# Patient Record
Sex: MALE | Race: BLACK OR AFRICAN AMERICAN | NOT HISPANIC OR LATINO | Employment: UNEMPLOYED | URBAN - METROPOLITAN AREA
[De-identification: names, ages, dates, MRNs, and addresses within clinical notes are randomized per-mention and may not be internally consistent; named-entity substitution may affect disease eponyms.]

---

## 2021-06-27 ENCOUNTER — HOSPITAL ENCOUNTER (EMERGENCY)
Facility: HOSPITAL | Age: 26
Discharge: HOME | End: 2021-06-28
Attending: EMERGENCY MEDICINE
Payer: MEDICAID

## 2021-06-27 DIAGNOSIS — R07.9 CHEST PAIN: ICD-10-CM

## 2021-06-27 DIAGNOSIS — G47.00 INSOMNIA, UNSPECIFIED TYPE: ICD-10-CM

## 2021-06-27 DIAGNOSIS — F16.10 ECSTASY ABUSE: Primary | ICD-10-CM

## 2021-06-27 PROCEDURE — 99284 EMERGENCY DEPT VISIT MOD MDM: CPT | Mod: 25,ER

## 2021-06-27 PROCEDURE — 36000 PLACE NEEDLE IN VEIN: CPT | Mod: ER

## 2021-06-28 VITALS
RESPIRATION RATE: 17 BRPM | HEIGHT: 73 IN | WEIGHT: 145 LBS | DIASTOLIC BLOOD PRESSURE: 75 MMHG | SYSTOLIC BLOOD PRESSURE: 116 MMHG | BODY MASS INDEX: 19.22 KG/M2 | HEART RATE: 56 BPM | OXYGEN SATURATION: 97 % | TEMPERATURE: 98 F

## 2021-06-28 LAB
ALBUMIN SERPL BCP-MCNC: 4.4 G/DL (ref 3.5–5.2)
ALP SERPL-CCNC: 86 U/L (ref 38–126)
ALT SERPL W/O P-5'-P-CCNC: 33 U/L (ref 10–44)
ANION GAP SERPL CALC-SCNC: 9 MMOL/L (ref 8–16)
AST SERPL-CCNC: 30 U/L (ref 15–46)
BASOPHILS # BLD AUTO: 0.06 K/UL (ref 0–0.2)
BASOPHILS NFR BLD: 0.8 % (ref 0–1.9)
BILIRUB SERPL-MCNC: 0.5 MG/DL (ref 0.1–1)
CALCIUM SERPL-MCNC: 9.7 MG/DL (ref 8.7–10.5)
CHLORIDE SERPL-SCNC: 104 MMOL/L (ref 95–110)
CO2 SERPL-SCNC: 25 MMOL/L (ref 23–29)
CREAT SERPL-MCNC: 1.3 MG/DL (ref 0.5–1.4)
DIFFERENTIAL METHOD: ABNORMAL
EOSINOPHIL # BLD AUTO: 0.4 K/UL (ref 0–0.5)
EOSINOPHIL NFR BLD: 5.4 % (ref 0–8)
ERYTHROCYTE [DISTWIDTH] IN BLOOD BY AUTOMATED COUNT: 13 % (ref 11.5–14.5)
EST. GFR  (AFRICAN AMERICAN): >60 ML/MIN/1.73 M^2
EST. GFR  (NON AFRICAN AMERICAN): >60 ML/MIN/1.73 M^2
GLUCOSE SERPL-MCNC: 92 MG/DL (ref 70–110)
HCT VFR BLD AUTO: 43.8 % (ref 40–54)
HGB BLD-MCNC: 15 G/DL (ref 14–18)
IMM GRANULOCYTES # BLD AUTO: 0.01 K/UL (ref 0–0.04)
IMM GRANULOCYTES NFR BLD AUTO: 0.1 % (ref 0–0.5)
LYMPHOCYTES # BLD AUTO: 4.5 K/UL (ref 1–4.8)
LYMPHOCYTES NFR BLD: 63.8 % (ref 18–48)
MCH RBC QN AUTO: 29.4 PG (ref 27–31)
MCHC RBC AUTO-ENTMCNC: 34.2 G/DL (ref 32–36)
MCV RBC AUTO: 86 FL (ref 82–98)
MONOCYTES # BLD AUTO: 0.5 K/UL (ref 0.3–1)
MONOCYTES NFR BLD: 6.8 % (ref 4–15)
NEUTROPHILS # BLD AUTO: 1.6 K/UL (ref 1.8–7.7)
NEUTROPHILS NFR BLD: 23.1 % (ref 38–73)
NRBC BLD-RTO: 0 /100 WBC
PLATELET # BLD AUTO: 219 K/UL (ref 150–450)
PMV BLD AUTO: 9.8 FL (ref 9.2–12.9)
POTASSIUM SERPL-SCNC: 3.9 MMOL/L (ref 3.5–5.1)
PROT SERPL-MCNC: 7.4 G/DL (ref 6–8.4)
RBC # BLD AUTO: 5.11 M/UL (ref 4.6–6.2)
SODIUM SERPL-SCNC: 138 MMOL/L (ref 136–145)
TROPONIN I SERPL-MCNC: <0.012 NG/ML (ref 0.01–0.03)
UUN UR-MCNC: 20 MG/DL (ref 2–20)
WBC # BLD AUTO: 7.1 K/UL (ref 3.9–12.7)

## 2021-06-28 PROCEDURE — 84484 ASSAY OF TROPONIN QUANT: CPT | Mod: ER | Performed by: EMERGENCY MEDICINE

## 2021-06-28 PROCEDURE — 80053 COMPREHEN METABOLIC PANEL: CPT | Mod: ER | Performed by: EMERGENCY MEDICINE

## 2021-06-28 PROCEDURE — 85025 COMPLETE CBC W/AUTO DIFF WBC: CPT | Mod: ER | Performed by: EMERGENCY MEDICINE

## 2022-07-27 ENCOUNTER — NURSE TRIAGE (OUTPATIENT)
Dept: ADMINISTRATIVE | Facility: CLINIC | Age: 27
End: 2022-07-27
Payer: MEDICAID

## 2022-07-27 NOTE — TELEPHONE ENCOUNTER
Spoke with patient states he wants to be seen.  Patient thinks he has an STI but is not sure.  He states the hair follicles in his genital area have a stinging sensation underneath the skin.  He states his symptoms affect his stamina.  Patient has black small dots on his penis.  Advised patient to be seen today. Patient verbalized understanding.  Patient requested Urgent Care information.  Connected patient to Tawnya at the Urgent Care whom told patient out of pocket cost would be between $150..00.  Patient offered OAC and RR services he declined.  Patient unsure if he will go to ER.  Advised patient to call back with worsening symptoms.  Patient then hung up called him back and he has no further needs at this time.     Reason for Disposition   Patient wants to be seen    Additional Information   Negative: Sounds like a life-threatening emergency to the triager   Negative: SEVERE pain (e.g., excruciating)   Negative: Large amount of blood from end of penis   Negative: Foreskin pulled back and stuck (not circumcised)   Negative: Fever > 100.4 F (38.0 C)   Negative: Unable to urinate (or only a few drops) and bladder feels very full   Negative: Prolonged unwanted erection (i.e., not related to sexual interest) and lasting > 4 hours   Negative: Patient sounds very sick or weak to the triager   Negative: Entire penis is swollen (i.e., edema)   Negative: Looks infected (e.g., draining sore, spreading redness)   Negative: Pain or burning with passing urine (urination)   Negative: Blood in urine (red, pink, or tea-colored)   Negative: Pus (white, yellow) or bloody discharge from end of penis   Negative: Swollen foreskin (not circumcised)   Negative: Tiny water blisters rash, 3 or more   Negative: Antibiotic treatment > 3 days for STI (e.g., penile discharge from gonorrhea, chlamydia) and painful urination not improved    Protocols used: PENIS AND SCROTUM SYMPTOMS-A-[place]